# Patient Record
Sex: FEMALE | Race: WHITE | NOT HISPANIC OR LATINO | URBAN - METROPOLITAN AREA
[De-identification: names, ages, dates, MRNs, and addresses within clinical notes are randomized per-mention and may not be internally consistent; named-entity substitution may affect disease eponyms.]

---

## 2023-02-27 ENCOUNTER — OFFICE (OUTPATIENT)
Dept: URBAN - METROPOLITAN AREA CLINIC 72 | Facility: CLINIC | Age: 68
End: 2023-02-27

## 2023-02-27 VITALS
HEART RATE: 100 BPM | OXYGEN SATURATION: 94 % | WEIGHT: 279 LBS | SYSTOLIC BLOOD PRESSURE: 122 MMHG | DIASTOLIC BLOOD PRESSURE: 75 MMHG | HEIGHT: 64 IN

## 2023-02-27 DIAGNOSIS — I10 ESSENTIAL (PRIMARY) HYPERTENSION: ICD-10-CM

## 2023-02-27 DIAGNOSIS — E66.01 MORBID (SEVERE) OBESITY DUE TO EXCESS CALORIES: ICD-10-CM

## 2023-02-27 DIAGNOSIS — K76.0 FATTY (CHANGE OF) LIVER, NOT ELSEWHERE CLASSIFIED: ICD-10-CM

## 2023-02-27 DIAGNOSIS — E11.9 TYPE 2 DIABETES MELLITUS WITHOUT COMPLICATIONS: ICD-10-CM

## 2023-02-27 DIAGNOSIS — Z85.42 PERSONAL HISTORY OF MALIGNANT NEOPLASM OF OTHER PARTS OF UTE: ICD-10-CM

## 2023-02-27 DIAGNOSIS — R19.5 OTHER FECAL ABNORMALITIES: ICD-10-CM

## 2023-02-27 DIAGNOSIS — R10.9 UNSPECIFIED ABDOMINAL PAIN: ICD-10-CM

## 2023-02-27 PROCEDURE — 99204 OFFICE O/P NEW MOD 45 MIN: CPT | Performed by: INTERNAL MEDICINE

## 2023-02-27 RX ORDER — SODIUM SULFATE, POTASSIUM SULFATE, MAGNESIUM SULFATE 17.5; 3.13; 1.6 G/ML; G/ML; G/ML
SOLUTION, CONCENTRATE ORAL
Qty: 1 | Refills: 0 | Status: ACTIVE
Start: 2023-02-27

## 2023-02-27 NOTE — SERVICENOTES
Our goal is to partner with you to improve your health and well being. It is important for you to complete necessary testing and follow the instructions given to you at your clinic visit. Our office will call you within 2 weeks with results of any testing but you may also call sooner to obtain results - [phone].   If you have any questions or concerns please feel free to call us.  We take your care very seriously and we thank you for your trust!
- schedule fibroscan
- schedule colonoscopy, you need this at least every 5 years given your history of endometrial cance. 
- get labs
- if the above is negative adn symtpoms peresist you may need a CT

## 2023-02-27 NOTE — SERVICEHPINOTES
br67 year old initially seen 9/2019 09/28/2018: Consultation - Fecal occult blood test positive, HTN (hypertension) (I10), DM type 2 (diabetes mellitus, type 2) (E11.9), Obesity, morbid, BMI 40.0-49.9, Hyperlipidemia (E78.5), Abnormal LFTs (R94.5), History of uterine cancer (Z85.42) 
br I recommended colonoscopy then and at least every 5 years as well as fibroscan.  SHe did not have either at [facility], she did not follow up br
[place] She is now being referred back by Ryann Flowers for fatty liver and hepatomegaly seen on US 1/2023. 
camilo page 
She went to her pcp clive of left sided abdominal pain that comes and goes.  Pain is on the left lower abdomen, to left flank and left upper mdi axillary.  She has been having it for about a few months.  She thinks that after eating it may get worse.  
camilo page Tabitha has never had a colonosocpy.  No constipation or diarrhea, no blood in the stool.  
cam[place] page She has had a total hysterectomy for uterine cancer.  br 
camilo page She does not drink alcohol.  NO family history of liver disease.  
camilo Lewis nurse has reviewed and updated the medication list with the patient (medication reconciliation). I have also reviewed the medication list. New updates were made to the patient's medical, social and family history. Pertinent details are also noted above in the HPI

## 2023-05-09 ENCOUNTER — OFFICE (OUTPATIENT)
Dept: URBAN - METROPOLITAN AREA CLINIC 67 | Facility: CLINIC | Age: 68
End: 2023-05-09
Payer: COMMERCIAL

## 2023-05-09 VITALS — HEIGHT: 64 IN

## 2023-05-09 DIAGNOSIS — K76.0 FATTY (CHANGE OF) LIVER, NOT ELSEWHERE CLASSIFIED: ICD-10-CM

## 2023-05-09 PROCEDURE — 76981 USE PARENCHYMA: CPT | Performed by: INTERNAL MEDICINE
